# Patient Record
Sex: FEMALE | Race: WHITE | Employment: FULL TIME | URBAN - METROPOLITAN AREA
[De-identification: names, ages, dates, MRNs, and addresses within clinical notes are randomized per-mention and may not be internally consistent; named-entity substitution may affect disease eponyms.]

---

## 2017-10-11 LAB — PAP-ABSTRACT: NORMAL

## 2019-11-29 ENCOUNTER — TRANSFERRED RECORDS (OUTPATIENT)
Dept: MULTI SPECIALTY CLINIC | Facility: CLINIC | Age: 44
End: 2019-11-29

## 2020-03-10 ENCOUNTER — OFFICE VISIT (OUTPATIENT)
Dept: FAMILY MEDICINE | Facility: CLINIC | Age: 45
End: 2020-03-10
Payer: COMMERCIAL

## 2020-03-10 VITALS
SYSTOLIC BLOOD PRESSURE: 128 MMHG | WEIGHT: 208.2 LBS | RESPIRATION RATE: 20 BRPM | TEMPERATURE: 98.8 F | HEIGHT: 66 IN | DIASTOLIC BLOOD PRESSURE: 88 MMHG | BODY MASS INDEX: 33.46 KG/M2 | HEART RATE: 94 BPM

## 2020-03-10 DIAGNOSIS — M79.672 LEFT FOOT PAIN: ICD-10-CM

## 2020-03-10 DIAGNOSIS — H65.93 FLUID LEVEL BEHIND TYMPANIC MEMBRANE OF BOTH EARS: ICD-10-CM

## 2020-03-10 DIAGNOSIS — G43.829 MENSTRUAL MIGRAINE WITHOUT STATUS MIGRAINOSUS, NOT INTRACTABLE: Primary | ICD-10-CM

## 2020-03-10 PROBLEM — Z97.5 IUD (INTRAUTERINE DEVICE) IN PLACE: Status: ACTIVE | Noted: 2020-03-10

## 2020-03-10 PROCEDURE — 99204 OFFICE O/P NEW MOD 45 MIN: CPT | Performed by: NURSE PRACTITIONER

## 2020-03-10 RX ORDER — SUMATRIPTAN 50 MG/1
50 TABLET, FILM COATED ORAL
Qty: 6 TABLET | Refills: 11 | Status: SHIPPED | OUTPATIENT
Start: 2020-03-10 | End: 2020-12-17

## 2020-03-10 SDOH — HEALTH STABILITY: MENTAL HEALTH: HOW OFTEN DO YOU HAVE A DRINK CONTAINING ALCOHOL?: NEVER

## 2020-03-10 ASSESSMENT — MIFFLIN-ST. JEOR: SCORE: 1603.2

## 2020-03-10 NOTE — PATIENT INSTRUCTIONS
Make an appointment to have orthotic made for your left foot.    For ears:  Sudafed at bedtime for 2 weeks.  If no improvement, let me know and I will refer you to ENT.      For your chronic headaches:  1. You need regular sleep  2. You need regular exercise  3. You need regular eating habits.  4. Stress management  5. You need to give up all sources of caffeine.   6. You shouldn't use medications for headache relief (like tylenol, ibuprofen or naproxen) more than twice per week.  7. Today you were given a prescription for Imitrex. Take one tablet at the start of a headache - repeat in 2 hours if needed. Only use this medication 1-2 times per week.        Thank you for choosing [facility].  You may be receiving an email and/or telephone survey request from [facility] Customer Experience regarding your visit today.  Please take a few minutes to respond to the survey to let us know how we are doing.      If you have questions or concerns, please contact us via [employer] or you can contact your care team at [phone].    Our Clinic hours are:  Monday 6:40 am  to 7:00 pm  Tuesday -Friday 6:40 am to 5:00 pm    The [place] outpatient lab hours are:  Monday - Friday 6:10 am to 4:45 pm  Saturdays 7:00 am to 11:00 am  Appointments are required, call [phone]    If you have clinical questions after hours or would like to schedule an appointment,  call the clinic at [phone].

## 2020-03-10 NOTE — PROGRESS NOTES
"Subjective     Colleen Hernández is a 44 year old female who presents to clinic today for the following health issues:    HPI   ENT Symptoms             Symptoms: cc Present Absent Comment   Fever/Chills   X    Fatigue   X    Muscle Aches   X    Eye Irritation   X    Sneezing   X    Nasal Kayden/Drg   X    Sinus Pressure/Pain   X    Loss of smell   X    Dental pain   X    Sore Throat   X    Swollen Glands   X    Ear Pain/Fullness X X  Plugged Ears, Fullness feeling, hearing loss   No pain   Cough   X    Wheeze   X    Chest Pain   X    Shortness of breath   X    Rash   X    Other         Symptom duration:  x 2 months    Symptom severity:  moderate    Treatments tried:  None    Contacts:  none        Headache  Onset: monthly around period times - unsure if due to period or Mirena     Description:   Location: base of skull, eye brow , eye area    Character: throbbing pain, dull pain, sharp pain, squeezing pain, global  Frequency:  Once a month   Duration:  3 days     Intensity: mild to severe    Progression of Symptoms:  same    Accompanying Signs & Symptoms:  Stiff neck: YES  Neck or upper back pain: YES- neck   Fever: no  Sinus pressure: no  Nausea or vomiting: YES  Dizziness: no  Numbness: no  Weakness: no  Visual changes: YES- when they are very bad     History:   Head trauma: no  Family history of migraines: no, but Sister passed away from brain aneurysm  Previous tests for headaches: YES- many years ago had MRI - Colleen did not have an aneurysm   Neurologist evaluations: no  Able to do daily activities: no when really bad   Wake with a headaches: no  Do headaches wake you up: no  Daily pain medication use: no  Work/school stressors/changes: YES- Mother passed away    Precipitating factors:   Does light make it worse: YES- when headaches are bad  Does sound make it worse: no    Alleviating factors:  Does sleep help: no    Therapies Tried and outcome: Ibuprofen (Advil, Motrin) and Excedrin - if a \"regualr headache- helps\", " "if a period headache - No     Concern - Left Heel Pain   No known injury   Onset: x 1 month     Description:   Pain in left heel      Intensity: severe when firs standing on it     Progression of Symptoms:  same    Accompanying Signs & Symptoms:  None     Previous history of similar problem:   None     Precipitating factors:   Worsened by: none     Alleviating factors:  Improved by: rest, massage     Therapies Tried and outcome: rest, massage - helps                 Reviewed and updated as needed this visit by Provider  Tobacco  Allergies  Meds  Problems  Med Hx  Surg Hx  Fam Hx         Review of Systems   ROS COMP: Constitutional, HEENT, cardiovascular, pulmonary, gi and gu systems are negative, except as otherwise noted.      Objective    /88   Pulse 94   Temp 98.8  F (37.1  C) (Tympanic)   Resp 20   Ht 1.664 m (5' 5.5\")   Wt 94.4 kg (208 lb 3.2 oz)   LMP 02/25/2020   Breastfeeding No   BMI 34.12 kg/m    Body mass index is 34.12 kg/m .  Physical Exam   GENERAL: healthy, alert and no distress  EYES: Eyes grossly normal to inspection, PERRL and conjunctivae and sclerae normal  HENT: ear canals and TM's normal, nose and mouth without ulcers or lesions  NECK: no adenopathy, no asymmetry, masses, or scars and thyroid normal to palpation  RESP: lungs clear to auscultation - no rales, rhonchi or wheezes  CV: regular rate and rhythm, normal S1 S2, no S3 or S4, no murmur, click or rub, no peripheral edema and peripheral pulses strong  ABDOMEN: soft, nontender, no hepatosplenomegaly, no masses and bowel sounds normal  MS: Feet: right foot normal. Left foot - flat foot. Tenderness along the plantar fascia. ROM normal.  NEURO: Normal strength and tone, sensory exam grossly normal, mentation intact and cranial nerves 2-12 intact  PSYCH: mentation appears normal, affect normal/bright            Assessment & Plan       ICD-10-CM    1. Menstrual migraine without status migrainosus, not intractable  G43.829 " "See below.  SUMAtriptan (IMITREX) 50 MG tablet     2. Fluid level behind tympanic membrane of both ears    H65.93 See below.   3. Left foot pain  M79.672 Patient has no arch in the left.  Pain at plantar fascia, foot rolls inward when standing and walking.  Recommend custom orthotics.  ORTHOTICS REFERRAL     BMI:   Estimated body mass index is 34.12 kg/m  as calculated from the following:    Height as of this encounter: 1.664 m (5' 5.5\").    Weight as of this encounter: 94.4 kg (208 lb 3.2 oz).   Weight management plan: Discussed healthy diet and exercise guidelines    Patient Instructions   Make an appointment to have orthotic made for your left foot.    For ears:  Sudafed at bedtime for 2 weeks.  If no improvement, let me know and I will refer you to ENT.      For your chronic headaches:  1. You need regular sleep  2. You need regular exercise  3. You need regular eating habits.  4. Stress management  5. You need to give up all sources of caffeine.   6. You shouldn't use medications for headache relief (like tylenol, ibuprofen or naproxen) more than twice per week.  7. Today you were given a prescription for Imitrex. Take one tablet at the start of a headache - repeat in 2 hours if needed. Only use this medication 1-2 times per week.        Thank you for choosing [facility].  You may be receiving an email and/or telephone survey request from [facility] Customer Experience regarding your visit today.  Please take a few minutes to respond to the survey to let us know how we are doing.      If you have questions or concerns, please contact us via [facility] or you can contact your care team at [phone].    Our Clinic hours are:  Monday 6:40 am  to 7:00 pm  Tuesday -Friday 6:40 am to 5:00 pm    The [place] outpatient lab hours are:  Monday - Friday 6:10 am to 4:45 pm  Saturdays 7:00 am to 11:00 am  Appointments are required, call [phone]    If you have clinical questions after hours or would like to " schedule an appointment,  call the clinic at [phone].        Return in about 2 weeks (around 3/24/2020), or if symptoms worsen or fail to improve.    The risks, benefits and treatment options of prescribed medications or other treatments have been discussed with the patient. The patient verbalized their understanding and should call or follow up if no improvement or if they develop further problems.    SENAIT Hein [facility]

## 2020-03-10 NOTE — Clinical Note
Please abstract the following data from this visit with this patient into the appropriate field in Epic:    Tests that can be patient reported without a hard copy:    Mammogram done on this date: 11/29/2019 (approximately), by this group: [facility],   and Pap smear done on this date: 10/11/2019 (approximately), by this group: [facility],   Thank You

## 2020-12-17 DIAGNOSIS — G43.829 MENSTRUAL MIGRAINE WITHOUT STATUS MIGRAINOSUS, NOT INTRACTABLE: ICD-10-CM

## 2020-12-17 RX ORDER — SUMATRIPTAN 50 MG/1
50 TABLET, FILM COATED ORAL
Qty: 6 TABLET | Refills: 0 | Status: SHIPPED | OUTPATIENT
Start: 2020-12-17 | End: 2020-12-18

## 2020-12-17 NOTE — TELEPHONE ENCOUNTER
Pt states that she has a migraine NOW and wants refill today please.  She will call back to schedule appt. Sandra Evans

## 2020-12-17 NOTE — TELEPHONE ENCOUNTER
Reason for Call:  Medication or medication refill:     Do you use a [facility]?  Name of the pharmacy and phone number for the current request:  [facility]    Name of the medication requested:   SUMAtriptan (IMITREX) 50 MG tablet          Other request: Caller is wondering if she can speak to a nurse about getting it either at a higher dosage or can she take more since the medication isn't really helping.     Can we leave a detailed message on this number? YES    Phone number patient can be reached at: Cell number on file:    Telephone Information:   Mobile [phone]       Best Time: anytime    Call taken on 12/17/2020 at 8:38 AM by Patricia Peterson LPN

## 2020-12-17 NOTE — TELEPHONE ENCOUNTER
Called pt left message to return call to clinic.    Micheline Vallejo  [facility]

## 2020-12-17 NOTE — TELEPHONE ENCOUNTER
[facility] please call pt - she is overdue for Physical and PAP, medication can be discussed at that appointment.    Cece HUANG RN, BSN

## 2020-12-17 NOTE — TELEPHONE ENCOUNTER
"Requested Prescriptions   Pending Prescriptions Disp Refills     SUMAtriptan (IMITREX) 50 MG tablet 6 tablet 0     Sig: Take 1 tablet (50 mg) by mouth at onset of headache for migraine May repeat in 2 hours. Max 4 tablets/24 hours.       Serotonin Agonists Failed - 12/17/2020  2:12 PM        Failed - Serotonin Agonist request needs review.     Please review patient's record. If patient has had 8 or more treatments in the past month, please forward to provider.          Passed - Blood pressure under 140/90 in past 12 months     BP Readings from Last 3 Encounters:   03/10/20 128/88                 Passed - Recent (12 mo) or future (30 days) visit within the authorizing provider's specialty     Patient has had an office visit with the authorizing provider or a provider within the authorizing providers department within the previous 12 mos or has a future within next 30 days. See \"Patient Info\" tab in inbasket, or \"Choose Columns\" in Meds & Orders section of the refill encounter.              Passed - Medication is active on med list        Passed - Patient is age 18 or older        Passed - No active pregnancy on record        Passed - No positive pregnancy test in past 12 months         Average is 7.3 tabs per month.     Prescription approved per [facility] Refill Protocol.    Needs appointment to discuss other medication options.     Attempted to contact patient, no answer, left voice message to call back.    [facility] [place] [facility] to deliver message.        "

## 2020-12-18 ENCOUNTER — VIRTUAL VISIT (OUTPATIENT)
Dept: FAMILY MEDICINE | Facility: CLINIC | Age: 45
End: 2020-12-18
Payer: COMMERCIAL

## 2020-12-18 DIAGNOSIS — G43.829 MENSTRUAL MIGRAINE WITHOUT STATUS MIGRAINOSUS, NOT INTRACTABLE: ICD-10-CM

## 2020-12-18 PROCEDURE — 99213 OFFICE O/P EST LOW 20 MIN: CPT | Mod: TEL | Performed by: NURSE PRACTITIONER

## 2020-12-18 RX ORDER — AMITRIPTYLINE HYDROCHLORIDE 10 MG/1
10 TABLET ORAL AT BEDTIME
Qty: 30 TABLET | Refills: 5 | Status: SHIPPED | OUTPATIENT
Start: 2020-12-18

## 2020-12-18 RX ORDER — SUMATRIPTAN 50 MG/1
50 TABLET, FILM COATED ORAL
Qty: 8 TABLET | Refills: 5 | Status: SHIPPED | OUTPATIENT
Start: 2020-12-18 | End: 2021-01-29

## 2020-12-18 NOTE — PATIENT INSTRUCTIONS
Start Elavil 10 mg daily at bedtime, this medication can also help you to sleep better    Continue Imitrex as needed     Try vitamin B 6 100-200 mg daily, can help to prevent migraines

## 2020-12-18 NOTE — PROGRESS NOTES
"Colleen Hernández is a 45 year old female who is being evaluated via a billable telephone visit.      The patient has been notified of following:     \"This telephone visit will be conducted via a call between you and your physician/provider. We have found that certain health care needs can be provided without the need for a physical exam.  This service lets us provide the care you need with a short phone conversation.  If a prescription is necessary we can send it directly to your pharmacy.  If lab work is needed we can place an order for that and you can then stop by our lab to have the test done at a later time.    Telephone visits are billed at different rates depending on your insurance coverage. During this emergency period, for some insurers they may be billed the same as an in-person visit.  Please reach out to your insurance provider with any questions.    If during the course of the call the physician/provider feels a telephone visit is not appropriate, you will not be charged for this service.\"    Patient has given verbal consent for Telephone visit?  Yes    What phone number would you like to be contacted at? [phone]     How would you like to obtain your AVS? Mail a copy    Subjective     Colleen Hernández is a 45 year old female who presents via phone visit today for the following health issues:    Chief Complaint   Patient presents with     Headache     She would like to discuss her headaches, she does take Immitrex but states she needs to take two tablets in order for the medicatin to work. Seems like the headaches come back every 12 hours.          HPI     Headache  Onset/Duration: YEARS   Description  Location: in the back of her head, across her forehead, behind her eyes.    Character: throbbing pain, dull pain, sharp pain, squeezing pain  Frequency:  Usually gets them around her period.   Duration:  One hour with Immitrex, wiuthout the medication they will last all day.   Wake with headaches: YES  Able " "to do daily activities when headache present: YES  Intensity:  Moderate to Severe   Progression of Symptoms:  same  Accompanying signs and symptoms:  Stiff neck: no  Neck or upper back pain: no  Sinus or URI symptoms no   Fever: no  Nausea or vomiting: no  Dizziness: no  Numbness/tingling: no  Weakness: no  Visual changes: This week she felt like she had to concentrate more on things when she had to read something, felt \"off\"   History  Head trauma: no  Family history of migraines: no  Daily pain medication use: no  Previous tests for headaches: no  Neurologist evaluation: no  Therapies tried and outcome: Imitrex              Outcome - usually effective as long as the can take the two tablets.   Frequent/daily pain medication use: no        Review of Systems   Constitutional, HEENT, cardiovascular, pulmonary, gi and gu systems are negative, except as otherwise noted.       Objective          Vitals:  No vitals were obtained today due to virtual visit.    healthy, alert and no distress  PSYCH: Alert and oriented times 3; coherent speech, normal   rate and volume, able to articulate logical thoughts, able   to abstract reason, no tangential thoughts, no hallucinations   or delusions  Her affect is normal  RESP: No cough, no audible wheezing, able to talk in full sentences  Remainder of exam unable to be completed due to telephone visits            Assessment/Plan:    Assessment & Plan     Menstrual migraine without status migrainosus, not intractable  -started to have headaches more frequently now, usually around her period, lasting for 3-5 days   -recommended to start Elavil and try over the counter vitamin B 6 supplement   -follow up in 1-2 months if no improvement   - SUMAtriptan (IMITREX) 50 MG tablet; Take 1 tablet (50 mg) by mouth at onset of headache for migraine May repeat in 2 hours. Max 4 tablets/24 hours.  - amitriptyline (ELAVIL) 10 MG tablet; Take 1 tablet (10 mg) by mouth At Bedtime            See " Patient Instructions    Return in about 4 weeks (around 1/15/2021), or if symptoms worsen or fail to improve.    SENAIT Parikh [facility]    Phone call duration:  7 minutes

## 2020-12-30 ENCOUNTER — E-VISIT (OUTPATIENT)
Dept: FAMILY MEDICINE | Facility: CLINIC | Age: 45
End: 2020-12-30
Payer: COMMERCIAL

## 2020-12-30 DIAGNOSIS — Z20.822 SUSPECTED 2019 NOVEL CORONAVIRUS INFECTION: Primary | ICD-10-CM

## 2020-12-30 PROCEDURE — 99421 OL DIG E/M SVC 5-10 MIN: CPT | Performed by: NURSE PRACTITIONER

## 2020-12-31 DIAGNOSIS — Z20.822 SUSPECTED 2019 NOVEL CORONAVIRUS INFECTION: ICD-10-CM

## 2020-12-31 PROCEDURE — U0003 INFECTIOUS AGENT DETECTION BY NUCLEIC ACID (DNA OR RNA); SEVERE ACUTE RESPIRATORY SYNDROME CORONAVIRUS 2 (SARS-COV-2) (CORONAVIRUS DISEASE [COVID-19]), AMPLIFIED PROBE TECHNIQUE, MAKING USE OF HIGH THROUGHPUT TECHNOLOGIES AS DESCRIBED BY CMS-2020-01-R: HCPCS | Performed by: NURSE PRACTITIONER

## 2021-01-01 LAB
SARS-COV-2 RNA SPEC QL NAA+PROBE: ABNORMAL
SPECIMEN SOURCE: ABNORMAL

## 2021-01-08 ENCOUNTER — TELEPHONE (OUTPATIENT)
Dept: FAMILY MEDICINE | Facility: CLINIC | Age: 46
End: 2021-01-08

## 2021-01-08 NOTE — TELEPHONE ENCOUNTER
Macie,    Please advise question below from fax received from pharmacy, thank you.    ARLEN Parra

## 2021-01-15 ENCOUNTER — HEALTH MAINTENANCE LETTER (OUTPATIENT)
Age: 46
End: 2021-01-15

## 2021-01-27 DIAGNOSIS — G43.829 MENSTRUAL MIGRAINE WITHOUT STATUS MIGRAINOSUS, NOT INTRACTABLE: ICD-10-CM

## 2021-01-27 NOTE — TELEPHONE ENCOUNTER
"Requested Prescriptions   Pending Prescriptions Disp Refills     SUMAtriptan (IMITREX) 50 MG tablet [Pharmacy Med Name: SUMATRIPTAN SUCCINATE 50MG TABS] 6 tablet 0     Sig: TAKE 1 TABLET (50 MG) BY MOUTH AT ONSET OF HEADACHE FOR MIGRAINE MAY REPEAT IN 2 HOURS. MAX 4 TABLETS/24 HOURS.       Serotonin Agonists Failed - 1/27/2021  1:31 PM        Failed - Serotonin Agonist request needs review.     Please review patient's record. If patient has had 8 or more treatments in the past month, please forward to provider.          Passed - Blood pressure under 140/90 in past 12 months     BP Readings from Last 3 Encounters:   03/10/20 128/88                 Passed - Recent (12 mo) or future (30 days) visit within the authorizing provider's specialty     Patient has had an office visit with the authorizing provider or a provider within the authorizing providers department within the previous 12 mos or has a future within next 30 days. See \"Patient Info\" tab in inbasket, or \"Choose Columns\" in Meds & Orders section of the refill encounter.              Passed - Medication is active on med list        Passed - Patient is age 18 or older        Passed - No active pregnancy on record        Passed - No positive pregnancy test in past 12 months             "

## 2021-01-28 NOTE — TELEPHONE ENCOUNTER
Routing refill request to provider for review/approval because:  Serotonin agonist.  Advise.  Kpavelrn

## 2021-01-29 RX ORDER — SUMATRIPTAN 50 MG/1
50 TABLET, FILM COATED ORAL
Qty: 6 TABLET | Refills: 0 | Status: SHIPPED | OUTPATIENT
Start: 2021-01-29 | End: 2021-03-23

## 2021-02-20 ENCOUNTER — HEALTH MAINTENANCE LETTER (OUTPATIENT)
Age: 46
End: 2021-02-20

## 2021-03-23 ENCOUNTER — TELEPHONE (OUTPATIENT)
Dept: FAMILY MEDICINE | Facility: CLINIC | Age: 46
End: 2021-03-23

## 2021-03-23 DIAGNOSIS — G43.829 MENSTRUAL MIGRAINE WITHOUT STATUS MIGRAINOSUS, NOT INTRACTABLE: ICD-10-CM

## 2021-03-23 RX ORDER — SUMATRIPTAN 50 MG/1
50-100 TABLET, FILM COATED ORAL
Qty: 32 TABLET | Refills: 1 | Status: SHIPPED | OUTPATIENT
Start: 2021-03-23

## 2021-03-23 NOTE — TELEPHONE ENCOUNTER
Script sent to pharmacy. Discussed dosing with patient. Would recommend no more than 2 treatments in a 2 week period on a chronic bases. Follow-up with PCP if headaches persist.    Yi Goode DNP, NP-C 3/23/2021 12:34 PM

## 2021-03-23 NOTE — TELEPHONE ENCOUNTER
Frank Richmond!    Colleen is requesting a refill on her Imitrex 50 mg tablets.  Please send prescription to [facility] (she  Is working at [place] today!)    Thank you!    Mariam Thomas CMA ([facility]) 10:49 AM 3/23/2021

## 2021-09-25 ENCOUNTER — HEALTH MAINTENANCE LETTER (OUTPATIENT)
Age: 46
End: 2021-09-25

## 2022-03-12 ENCOUNTER — HEALTH MAINTENANCE LETTER (OUTPATIENT)
Age: 47
End: 2022-03-12

## 2023-01-07 ENCOUNTER — HEALTH MAINTENANCE LETTER (OUTPATIENT)
Age: 48
End: 2023-01-07

## 2023-04-22 ENCOUNTER — HEALTH MAINTENANCE LETTER (OUTPATIENT)
Age: 48
End: 2023-04-22